# Patient Record
Sex: FEMALE | Race: OTHER | HISPANIC OR LATINO | URBAN - METROPOLITAN AREA
[De-identification: names, ages, dates, MRNs, and addresses within clinical notes are randomized per-mention and may not be internally consistent; named-entity substitution may affect disease eponyms.]

---
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
## 2023-03-03 ENCOUNTER — OFFICE (OUTPATIENT)
Dept: URBAN - METROPOLITAN AREA CLINIC 116 | Facility: CLINIC | Age: 18
Setting detail: OPHTHALMOLOGY
End: 2023-03-03
Payer: COMMERCIAL

## 2023-03-03 DIAGNOSIS — H01.005: ICD-10-CM

## 2023-03-03 DIAGNOSIS — H01.002: ICD-10-CM

## 2023-03-03 PROBLEM — H52.13 MYOPIA; BOTH EYES: Status: ACTIVE | Noted: 2023-03-03

## 2023-03-03 PROCEDURE — 92004 COMPRE OPH EXAM NEW PT 1/>: CPT | Performed by: OPTOMETRIST

## 2023-03-03 ASSESSMENT — KERATOMETRY
OD_AXISANGLE_DEGREES: 080
OD_K1POWER_DIOPTERS: 42.50
OS_AXISANGLE_DEGREES: 105
OD_K2POWER_DIOPTERS: 43.25
OS_K1POWER_DIOPTERS: 42.50
OS_K2POWER_DIOPTERS: 43.25

## 2023-03-03 ASSESSMENT — REFRACTION_AUTOREFRACTION
OS_SPHERE: -6.50
OD_SPHERE: -6.00
OD_AXIS: 095
OS_AXIS: 025
OD_CYLINDER: -0.25
OS_CYLINDER: -0.25

## 2023-03-03 ASSESSMENT — REFRACTION_CURRENTRX
OS_AXIS: 005
OS_CYLINDER: -0.50
OD_SPHERE: -5.25
OD_AXIS: 10
OD_VPRISM_DIRECTION: SV
OS_SPHERE: -6.00
OD_OVR_VA: 20/
OD_CYLINDER: -2.00
OS_OVR_VA: 20/
OS_VPRISM_DIRECTION: SV
OD_CYLINDER: SPH
OD_OVR_VA: 20/
OS_SPHERE: -5.00
OD_SPHERE: -6.00
OS_OVR_VA: 20/

## 2023-03-03 ASSESSMENT — REFRACTION_MANIFEST
OD_VA1: 20/20
OD_VA1: 20/25
OS_SPHERE: -6.00
OS_CYLINDER: SPH
OD_SPHERE: -6.00
OS_VA1: 20/25
OS_SPHERE: -6.25
OD_CYLINDER: SPH
OS_CYLINDER: -0.50
OD_SPHERE: -6.00
OS_AXIS: 180
OS_VA1: 20/20

## 2023-03-03 ASSESSMENT — SPHEQUIV_DERIVED
OD_SPHEQUIV: -6.125
OS_SPHEQUIV: -6.625
OS_SPHEQUIV: -6.25

## 2023-03-03 ASSESSMENT — LID EXAM ASSESSMENTS
OD_BLEPHARITIS: RLL T
OS_BLEPHARITIS: LLL T

## 2023-03-03 ASSESSMENT — AXIALLENGTH_DERIVED
OS_AL: 26.7828
OD_AL: 26.534
OS_AL: 26.5958

## 2023-03-03 ASSESSMENT — TONOMETRY
OD_IOP_MMHG: 20
OS_IOP_MMHG: 20

## 2023-03-03 ASSESSMENT — VISUAL ACUITY
OD_BCVA: 20/25
OS_BCVA: 20/25

## 2023-03-03 ASSESSMENT — CONFRONTATIONAL VISUAL FIELD TEST (CVF)
OD_FINDINGS: FULL
OS_FINDINGS: FULL